# Patient Record
Sex: MALE | Race: WHITE
[De-identification: names, ages, dates, MRNs, and addresses within clinical notes are randomized per-mention and may not be internally consistent; named-entity substitution may affect disease eponyms.]

---

## 2018-08-21 ENCOUNTER — HOSPITAL ENCOUNTER (EMERGENCY)
Dept: HOSPITAL 25 - UCCORT | Age: 58
Discharge: HOME | End: 2018-08-21
Payer: MEDICARE

## 2018-08-21 VITALS — DIASTOLIC BLOOD PRESSURE: 78 MMHG | SYSTOLIC BLOOD PRESSURE: 115 MMHG

## 2018-08-21 DIAGNOSIS — M75.92: Primary | ICD-10-CM

## 2018-08-21 DIAGNOSIS — F17.210: ICD-10-CM

## 2018-08-21 PROCEDURE — 99212 OFFICE O/P EST SF 10 MIN: CPT

## 2018-08-21 PROCEDURE — G0463 HOSPITAL OUTPT CLINIC VISIT: HCPCS

## 2018-08-21 NOTE — UC
Upper Extremity HPI





- HPI Summary


HPI Summary: 





Patient presents complaining of left arm pain after lifting something and 

twisting his arm funny 3 days ago.  Patient has intermittently had pain in this 

arm after injuring it possibly 6 years ago.  Patient states the pain extends 

from his posterior shoulder to his mid thigh bicep.  Patient states pain is 

spasm like and intermittent.  Patient denies paresthesias.  Patient has 

weakness.  Patient is right-hand dominant.  Patient is a seasonal pain and 

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  Patient has not taken anything for pain.  Patient 

states pain is better with rest and worse with activity and movement.  No 

direct trauma.  No neck or back pain.  No other complaints.  Patient's 

medications reviewed this visit.





- History of Current Complaint


Stated Complaint: LEFT ARM COMPLAINT


Time Seen by Provider: 08/21/18 14:46


Hx Obtained From: Patient





- Allergies/Home Medications


Allergies/Adverse Reactions: 


 Allergies











Allergy/AdvReac Type Severity Reaction Status Date / Time


 


No Known Allergies Allergy   Verified 08/21/18 15:16











Home Medications: 


 Home Medications





NK [No Home Medications Reported]  08/21/18 [History Confirmed 08/21/18]











PMH/Surg Hx/FS Hx/Imm Hx


Previously Healthy: Yes





- Surgical History


Surgical History: Yes


Surgery Procedure, Year, and Place: c5 neck





- Family History


Known Family History: Positive: None, Other - non contributory





- Social History


Occupation: Employed Part-time


Lives: With Family


Alcohol Use: Rare


Substance Use Type: Marijuana


Substance Use Comment - Amount & Last Used: 3 times a day


Smoking Status (MU): Heavy Every Day Tobacco Smoker


Amount Used/How Often: 1/2 ppd


Length of Time of Smoking/Using Tobacco: age 22





Review of Systems


Constitutional: Negative


Musculoskeletal: Other: - LUE pain


All Other Systems Reviewed And Are Negative: Yes





Physical Exam





- Summary


Physical Exam Summary: 





Vital Signs Reviewed: Yes


A+Ox3, no distress


Eyes: Conjunctiva Clear, ANTHONY. EOM intact and full


ENT: Hearing grossly normal  TM x 2 clear, mmoist, uvula midline, no exudate, 

no erythema


Neck: Positive: Supple


Respiratory: Positive: No respiratory distress, No accessory muscle use + CTA 

throughout  no w/r


Cardiovascular: RRR  nl s1, s2  no m/r  CBT <2  sec


abd soft + BS nt/nd  no guarding, no distension


Musculoskeletal Exam: No pain spinous process c/t/l/s. + abduct shoulder  + flex

/ext elbow  + pronate/supinate + flex/ext wrist  TTP insertion left lateral 

shoulder with extension to midlateral bicep. 


Neurological: Positive: Alert,  + sensation throughout + thumb up, a ok, finger 

spread, finger cross 5/5 grasp


Psychological: Positive: Normal Response To Family


Skin: Positive: no rash, no ecchymosis





Triage Information Reviewed: Yes





Upper Extremity Course/Dx





- Course


Course Of Treatment: Patient with tenderness incision bicep extending to his 

mid bicipital area.  Of his left nondominant.  Patient states he had injured 

presents with 6 years ago and once in a while he was finding gets a flare.  

Patient states this occurred 3 or 4 days ago.  Patient's position of comfort 

his rest.  Patient with good CSM distally.  Will place patient in sling.  

Motrin Tylenol for pain.  Patient declined analgesia here.  Recommended heat 

and stretching.  Patient referred to sports medicine.  Patient also given a 

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  Patient comfortable and in 

agreement with plan.





- Differential Dx/Diagnosis


Provider Diagnoses: left shoulder tendinitis





Discharge





- Sign-Out/Discharge


Documenting (check all that apply): Patient Departure


All imaging exams completed and their final reports reviewed: No Studies





- Discharge Plan


Condition: Stable


Disposition: HOME


Patient Education Materials:  Tendinitis (ED)


Forms:  *Work Release


Referrals: 


[facility] [Provider Group]


[facility] [Outside]


No Primary Care Phys,[facility]OP[facility] [Primary Care Provider] - 


Additional Instructions: 


-wear sling for comfort and support. relax your shoulder so the sling holds the 

weight of your shoulder


- Take your arm outside of your sling and fully bend/stretching of elbow and 

makes small circles at your shoulder as demonstrated in the urgent care


-apply ice (20 min at a time) every 2-3 hours for the next 2 days


--Okay to alternate ibuprofen (Advil, Motrin)600mg  and Tylenol 1000mg every 3 

hours for pain. Take with food. Do NOT take for more than 4-5 days. 


-Contact the sports medicine group to schedule a follow-up appointment.  

Contact the group, your doctor or return with questions or concerns








- Billing Disposition and Condition


Condition: STABLE


Disposition: Home